# Patient Record
Sex: MALE | Race: WHITE | Employment: FULL TIME | URBAN - NONMETROPOLITAN AREA
[De-identification: names, ages, dates, MRNs, and addresses within clinical notes are randomized per-mention and may not be internally consistent; named-entity substitution may affect disease eponyms.]

---

## 2018-02-24 ENCOUNTER — HOSPITAL ENCOUNTER (EMERGENCY)
Dept: GENERAL RADIOLOGY | Age: 28
Discharge: HOME | End: 2018-02-24

## 2018-02-24 ENCOUNTER — HOSPITAL ENCOUNTER (EMERGENCY)
Age: 28
Discharge: HOME | End: 2018-02-24

## 2018-02-24 VITALS
HEART RATE: 69 BPM | BODY MASS INDEX: 37.35 KG/M2 | TEMPERATURE: 99.5 F | RESPIRATION RATE: 16 BRPM | HEIGHT: 74 IN | DIASTOLIC BLOOD PRESSURE: 82 MMHG | OXYGEN SATURATION: 98 % | SYSTOLIC BLOOD PRESSURE: 128 MMHG | WEIGHT: 291 LBS

## 2018-02-24 DIAGNOSIS — M54.32 SCIATICA OF LEFT SIDE: ICD-10-CM

## 2018-02-24 DIAGNOSIS — M62.830 LUMBAR PARASPINAL MUSCLE SPASM: Primary | ICD-10-CM

## 2018-02-24 PROCEDURE — 99214 OFFICE O/P EST MOD 30 MIN: CPT

## 2018-02-24 PROCEDURE — 99203 OFFICE O/P NEW LOW 30 MIN: CPT | Performed by: NURSE PRACTITIONER

## 2018-02-24 PROCEDURE — 96372 THER/PROPH/DIAG INJ SC/IM: CPT

## 2018-02-24 PROCEDURE — 6360000002 HC RX W HCPCS: Performed by: NURSE PRACTITIONER

## 2018-02-24 PROCEDURE — 72100 X-RAY EXAM L-S SPINE 2/3 VWS: CPT

## 2018-02-24 RX ORDER — METHYLPREDNISOLONE 4 MG/1
TABLET ORAL
Qty: 1 KIT | Refills: 0 | Status: SHIPPED | OUTPATIENT
Start: 2018-02-24 | End: 2018-03-02

## 2018-02-24 RX ORDER — METHYLPREDNISOLONE ACETATE 80 MG/ML
80 INJECTION, SUSPENSION INTRA-ARTICULAR; INTRALESIONAL; INTRAMUSCULAR; SOFT TISSUE ONCE
Status: COMPLETED | OUTPATIENT
Start: 2018-02-24 | End: 2018-02-24

## 2018-02-24 RX ORDER — CYCLOBENZAPRINE HCL 10 MG
10 TABLET ORAL 3 TIMES DAILY PRN
Qty: 30 TABLET | Refills: 0 | Status: SHIPPED | OUTPATIENT
Start: 2018-02-24 | End: 2018-03-06

## 2018-02-24 RX ORDER — KETOROLAC TROMETHAMINE 30 MG/ML
60 INJECTION, SOLUTION INTRAMUSCULAR; INTRAVENOUS ONCE
Status: COMPLETED | OUTPATIENT
Start: 2018-02-24 | End: 2018-02-24

## 2018-02-24 RX ORDER — IBUPROFEN 600 MG/1
600 TABLET ORAL EVERY 6 HOURS PRN
Qty: 60 TABLET | Refills: 0 | Status: SHIPPED | OUTPATIENT
Start: 2018-02-24 | End: 2020-09-02 | Stop reason: ALTCHOICE

## 2018-02-24 RX ADMIN — KETOROLAC TROMETHAMINE 60 MG: 30 INJECTION, SOLUTION INTRAMUSCULAR at 14:54

## 2018-02-24 RX ADMIN — METHYLPREDNISOLONE ACETATE 80 MG: 80 INJECTION, SUSPENSION INTRA-ARTICULAR; INTRALESIONAL; INTRAMUSCULAR; SOFT TISSUE at 14:52

## 2018-02-24 ASSESSMENT — ENCOUNTER SYMPTOMS
COUGH: 0
CHEST TIGHTNESS: 0
BACK PAIN: 1

## 2018-02-24 ASSESSMENT — PAIN SCALES - GENERAL
PAINLEVEL_OUTOF10: 3

## 2018-02-24 ASSESSMENT — PAIN DESCRIPTION - PAIN TYPE
TYPE: CHRONIC PAIN
TYPE: ACUTE PAIN

## 2018-02-24 ASSESSMENT — PAIN DESCRIPTION - LOCATION
LOCATION: HIP
LOCATION: HIP

## 2018-02-24 ASSESSMENT — PAIN DESCRIPTION - ORIENTATION
ORIENTATION: RIGHT;LEFT
ORIENTATION: LEFT

## 2018-02-24 ASSESSMENT — PAIN DESCRIPTION - FREQUENCY: FREQUENCY: CONTINUOUS

## 2018-02-24 ASSESSMENT — PAIN DESCRIPTION - DESCRIPTORS
DESCRIPTORS: ACHING
DESCRIPTORS: CONSTANT;ACHING

## 2019-05-06 ENCOUNTER — HOSPITAL ENCOUNTER (EMERGENCY)
Age: 29
Discharge: HOME | End: 2019-05-06

## 2019-05-06 VITALS
WEIGHT: 280 LBS | OXYGEN SATURATION: 98 % | SYSTOLIC BLOOD PRESSURE: 130 MMHG | HEIGHT: 74 IN | TEMPERATURE: 98.4 F | DIASTOLIC BLOOD PRESSURE: 82 MMHG | HEART RATE: 98 BPM | RESPIRATION RATE: 16 BRPM | BODY MASS INDEX: 35.94 KG/M2

## 2019-05-06 DIAGNOSIS — J01.90 ACUTE BACTERIAL SINUSITIS: Primary | ICD-10-CM

## 2019-05-06 DIAGNOSIS — B96.89 ACUTE BACTERIAL SINUSITIS: Primary | ICD-10-CM

## 2019-05-06 LAB
GROUP A STREP CULTURE, REFLEX: NEGATIVE
REFLEX THROAT C + S: NORMAL

## 2019-05-06 PROCEDURE — 99214 OFFICE O/P EST MOD 30 MIN: CPT

## 2019-05-06 PROCEDURE — 87880 STREP A ASSAY W/OPTIC: CPT

## 2019-05-06 PROCEDURE — 99213 OFFICE O/P EST LOW 20 MIN: CPT | Performed by: NURSE PRACTITIONER

## 2019-05-06 PROCEDURE — 87070 CULTURE OTHR SPECIMN AEROBIC: CPT

## 2019-05-06 RX ORDER — DOXYCYCLINE HYCLATE 100 MG
100 TABLET ORAL 2 TIMES DAILY
Qty: 14 TABLET | Refills: 0 | Status: SHIPPED | OUTPATIENT
Start: 2019-05-06 | End: 2019-05-13

## 2019-05-06 ASSESSMENT — ENCOUNTER SYMPTOMS
CHEST TIGHTNESS: 0
ABDOMINAL PAIN: 0
DIARRHEA: 0
SINUS PRESSURE: 1
RHINORRHEA: 0
SORE THROAT: 1
SHORTNESS OF BREATH: 0
COUGH: 1
VOMITING: 0
NAUSEA: 0

## 2019-05-06 ASSESSMENT — PAIN - FUNCTIONAL ASSESSMENT: PAIN_FUNCTIONAL_ASSESSMENT: ACTIVITIES ARE NOT PREVENTED

## 2019-05-06 ASSESSMENT — PAIN SCALES - GENERAL: PAINLEVEL_OUTOF10: 2

## 2019-05-06 ASSESSMENT — PAIN DESCRIPTION - PAIN TYPE: TYPE: ACUTE PAIN

## 2019-05-06 ASSESSMENT — PAIN DESCRIPTION - DESCRIPTORS: DESCRIPTORS: HEADACHE

## 2019-05-06 NOTE — ED TRIAGE NOTES
Patient ambulated to room with complaint of nasal congestion, scratchy throat and headache.  States symptoms started last Wednesday

## 2019-05-06 NOTE — ED PROVIDER NOTES
[address]       Chief Complaint   Patient presents with    Headache     sinus pressure    Nasal Congestion       Nurses Notes reviewed and I agree except as noted in the HPI. HISTORY OF PRESENT ILLNESS   Yang Glynn is a 33 y.o. male who presents for evaluation of sinus congestion/pressure since last Wednesday and has not taken any medications for his symptoms. He has has a sore throat and is concerned for strep because it hurts pretty bad. REVIEW OF SYSTEMS     Review of Systems   Constitutional: Negative for chills, fatigue and fever. HENT: Positive for congestion, postnasal drip, sinus pressure and sore throat. Negative for ear pain and rhinorrhea. Respiratory: Positive for cough. Negative for chest tightness and shortness of breath. Cardiovascular: Negative for chest pain. Gastrointestinal: Negative for abdominal pain, diarrhea, nausea and vomiting. Skin: Negative for rash. Allergic/Immunologic: Negative for environmental allergies and food allergies. Neurological: Negative for headaches. PAST MEDICAL HISTORY   History reviewed. No pertinent past medical history. SURGICAL HISTORY     Patient  has a past surgical history that includes Wrist surgery. CURRENT MEDICATIONS       Previous Medications    IBUPROFEN (ADVIL;MOTRIN) 600 MG TABLET    Take 1 tablet by mouth every 6 hours as needed for Pain    MENTHOL, TOPICAL ANALGESIC, (BIOFREEZE EX)    Apply topically       ALLERGIES     Patient is is allergic to pcn [penicillins]. FAMILY HISTORY     Patient'sfamily history includes Diabetes in his father. SOCIAL HISTORY     Patient  reports that he has been smoking cigarettes. He has a 2.50 pack-year smoking history. He has never used smokeless tobacco. He reports that he drinks about 2.4 oz of alcohol per week. He reports that he does not use drugs.     PHYSICAL EXAM     ED TRIAGE VITALS  BP: 130/82, Temp: 98.4 °F (36.9 °C), Pulse: 98, Resp: 16, SpO2: 98 %  Physical Exam   Constitutional: He is oriented to person, place, and time. He appears well-developed and well-nourished. He is cooperative. No distress. HENT:   Head: Normocephalic and atraumatic. Right Ear: Tympanic membrane, external ear and ear canal normal.   Left Ear: Tympanic membrane, external ear and ear canal normal.   Nose: Mucosal edema and rhinorrhea present. Right sinus exhibits no maxillary sinus tenderness and no frontal sinus tenderness. Left sinus exhibits no maxillary sinus tenderness and no frontal sinus tenderness. Mouth/Throat: Uvula is midline and mucous membranes are normal. Posterior oropharyngeal edema and posterior oropharyngeal erythema present. Eyes: Conjunctivae are normal.   Neck: Normal range of motion. Cardiovascular: Normal rate and normal heart sounds. Pulmonary/Chest: Effort normal and breath sounds normal.   Lymphadenopathy:     He has no cervical adenopathy. Neurological: He is alert and oriented to person, place, and time. Skin: Skin is warm and dry. No rash (on exposed surfaces) noted. Psychiatric: He has a normal mood and affect. His speech is normal.   Nursing note and vitals reviewed. DIAGNOSTIC RESULTS   Labs:   Results for orders placed or performed during the hospital encounter of 05/06/19   Strep A culture, throat   Result Value Ref Range    REFLEX THROAT C + S INDICATED    STREP A ANTIGEN   Result Value Ref Range    GROUP A STREP CULTURE, REFLEX NEGATIVE        IMAGING:  No orders to display     URGENT CARE COURSE:     Vitals:    05/06/19 1253   BP: 130/82   Pulse: 98   Resp: 16   Temp: 98.4 °F (36.9 °C)   TempSrc: Temporal   SpO2: 98%   Weight: 280 lb (127 kg)   Height: 6' 2\" (1.88 m)       Medications - No data to display  PROCEDURES:  None  FINALIMPRESSION      1.  Acute bacterial sinusitis        DISPOSITION/PLAN   DISPOSITION    Discharge   Strep negative culture pending  Physical assessment findings, diagnostic testing(s) if applicable, and vital signs reviewed with patient/patient representative. Questions answered. Medications as directed, including OTC medications for supportive care. Education provided on medications. Differential diagnosis(s) discussed with patient/patient representative. Home care/self care instructions reviewed with patient/patient representative. Patient is to follow-up with family care provider in 2-3 days if no improvement. Patient is to go to the emergency department if symptoms worsen. Patient/patient representative is aware of care plan, questions answered, verbalizes understanding and is in agreement. Teach back method used for patient/patient representative teaching(s) and printed instructions attached to after visit summary.     PATIENT REFERRED TO:  [facility]  [address] [facility]  [phone]    As needed, If symptoms worsen, return for further evaluation    DISCHARGE MEDICATIONS:  New Prescriptions    DOXYCYCLINE HYCLATE (VIBRA-TABS) 100 MG TABLET    Take 1 tablet by mouth 2 times daily for 7 days     Current Discharge Medication List          Nani Stevens, [address], APRN - CNP  05/06/19 0496

## 2019-05-08 LAB — THROAT/NOSE CULTURE: NORMAL

## 2019-05-20 ENCOUNTER — HOSPITAL ENCOUNTER (EMERGENCY)
Age: 29
Discharge: HOME | End: 2019-05-20

## 2019-05-20 VITALS
BODY MASS INDEX: 35.94 KG/M2 | RESPIRATION RATE: 16 BRPM | WEIGHT: 280 LBS | SYSTOLIC BLOOD PRESSURE: 134 MMHG | DIASTOLIC BLOOD PRESSURE: 84 MMHG | HEART RATE: 86 BPM | OXYGEN SATURATION: 99 % | HEIGHT: 74 IN | TEMPERATURE: 97.6 F

## 2019-05-20 DIAGNOSIS — S50.312A ABRASION OF LEFT ELBOW, INITIAL ENCOUNTER: ICD-10-CM

## 2019-05-20 DIAGNOSIS — S60.512A ABRASION, HAND, LEFT, INITIAL ENCOUNTER: Primary | ICD-10-CM

## 2019-05-20 PROCEDURE — 99214 OFFICE O/P EST MOD 30 MIN: CPT | Performed by: NURSE PRACTITIONER

## 2019-05-20 PROCEDURE — 2709999900 HC NON-CHARGEABLE SUPPLY

## 2019-05-20 PROCEDURE — 6370000000 HC RX 637 (ALT 250 FOR IP): Performed by: NURSE PRACTITIONER

## 2019-05-20 PROCEDURE — 99212 OFFICE O/P EST SF 10 MIN: CPT

## 2019-05-20 RX ORDER — DIAPER,BRIEF,INFANT-TODD,DISP
EACH MISCELLANEOUS
Qty: 30 G | Refills: 1 | Status: SHIPPED | OUTPATIENT
Start: 2019-05-20 | End: 2019-05-30

## 2019-05-20 RX ORDER — DIAPER,BRIEF,INFANT-TODD,DISP
EACH MISCELLANEOUS 2 TIMES DAILY
Status: DISCONTINUED | OUTPATIENT
Start: 2019-05-20 | End: 2019-05-20 | Stop reason: HOSPADM

## 2019-05-20 RX ADMIN — BACITRACIN ZINC 1 G: 500 OINTMENT TOPICAL at 08:38

## 2019-05-20 ASSESSMENT — ENCOUNTER SYMPTOMS
EYE PAIN: 0
EYE DISCHARGE: 0
COLOR CHANGE: 0
EYE REDNESS: 0
SORE THROAT: 0
DIARRHEA: 0
SHORTNESS OF BREATH: 0
NAUSEA: 0
VOMITING: 0
EYE ITCHING: 0

## 2019-05-20 ASSESSMENT — PAIN DESCRIPTION - DESCRIPTORS: DESCRIPTORS: CONSTANT

## 2019-05-20 ASSESSMENT — PAIN DESCRIPTION - PROGRESSION: CLINICAL_PROGRESSION: NOT CHANGED

## 2019-05-20 ASSESSMENT — PAIN DESCRIPTION - FREQUENCY: FREQUENCY: CONTINUOUS

## 2019-05-20 ASSESSMENT — PAIN DESCRIPTION - PAIN TYPE: TYPE: ACUTE PAIN

## 2019-05-20 ASSESSMENT — PAIN DESCRIPTION - LOCATION: LOCATION: ARM

## 2019-05-20 ASSESSMENT — PAIN DESCRIPTION - ONSET: ONSET: SUDDEN

## 2019-05-20 ASSESSMENT — PAIN - FUNCTIONAL ASSESSMENT: PAIN_FUNCTIONAL_ASSESSMENT: PREVENTS OR INTERFERES SOME ACTIVE ACTIVITIES AND ADLS

## 2019-05-20 ASSESSMENT — PAIN SCALES - GENERAL: PAINLEVEL_OUTOF10: 2

## 2019-05-20 ASSESSMENT — PAIN DESCRIPTION - ORIENTATION: ORIENTATION: LEFT
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
## 2019-05-20 NOTE — PROGRESS NOTES
Wounds cleaned with wound wash and dried thoroughly. Bacitracin ointment applied and covered with 2 band aids.

## 2019-06-25 ENCOUNTER — HOSPITAL ENCOUNTER (EMERGENCY)
Age: 29
Discharge: HOME | End: 2019-06-25

## 2019-06-25 ENCOUNTER — HOSPITAL ENCOUNTER (EMERGENCY)
Dept: GENERAL RADIOLOGY | Age: 29
Discharge: HOME | End: 2019-06-25

## 2019-06-25 VITALS
BODY MASS INDEX: 35.94 KG/M2 | HEART RATE: 112 BPM | OXYGEN SATURATION: 97 % | WEIGHT: 280 LBS | SYSTOLIC BLOOD PRESSURE: 143 MMHG | HEIGHT: 74 IN | RESPIRATION RATE: 16 BRPM | TEMPERATURE: 97.4 F | DIASTOLIC BLOOD PRESSURE: 79 MMHG

## 2019-06-25 DIAGNOSIS — M25.562 ACUTE PAIN OF LEFT KNEE: Primary | ICD-10-CM

## 2019-06-25 PROCEDURE — 99213 OFFICE O/P EST LOW 20 MIN: CPT

## 2019-06-25 PROCEDURE — 2709999900 HC NON-CHARGEABLE SUPPLY

## 2019-06-25 PROCEDURE — 99214 OFFICE O/P EST MOD 30 MIN: CPT | Performed by: NURSE PRACTITIONER

## 2019-06-25 PROCEDURE — 73564 X-RAY EXAM KNEE 4 OR MORE: CPT

## 2019-06-25 ASSESSMENT — ENCOUNTER SYMPTOMS: COUGH: 0

## 2019-06-25 ASSESSMENT — PAIN DESCRIPTION - ORIENTATION: ORIENTATION: LEFT

## 2019-06-25 ASSESSMENT — PAIN SCALES - GENERAL: PAINLEVEL_OUTOF10: 2

## 2019-06-25 ASSESSMENT — PAIN DESCRIPTION - LOCATION: LOCATION: KNEE

## 2019-06-25 NOTE — ED TRIAGE NOTES
AMbulatory to room 3 with limp c/o left knee pain. Refused wheelchair. Pt c/o left knee pain \" 2 weeks ago I reach to back car seat to get something and felt a pop left knee. Had pain on and off. Then today while at work jazz and felt another pop\" pt skin red- sunburn and pt diaphoretic.  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

## 2019-06-25 NOTE — ED NOTES
Pt verbalized discharge instructions. Pt informed to go to ER if develop chest pain, shortness of breath or abdominal pain. Pt ambulatory out in stable condition. Assessment unchanged.        Salvatore Cruz RN  06/25/19 8619

## 2019-10-16 ENCOUNTER — HOSPITAL ENCOUNTER (EMERGENCY)
Age: 29
Discharge: HOME | End: 2019-10-16

## 2019-10-16 VITALS
TEMPERATURE: 98.1 F | HEIGHT: 74 IN | HEART RATE: 88 BPM | WEIGHT: 272 LBS | SYSTOLIC BLOOD PRESSURE: 131 MMHG | RESPIRATION RATE: 20 BRPM | BODY MASS INDEX: 34.91 KG/M2 | OXYGEN SATURATION: 98 % | DIASTOLIC BLOOD PRESSURE: 60 MMHG

## 2019-10-16 DIAGNOSIS — J06.9 VIRAL URI WITH COUGH: Primary | ICD-10-CM

## 2019-10-16 DIAGNOSIS — Z72.0 TOBACCO USE: ICD-10-CM

## 2019-10-16 PROCEDURE — 99213 OFFICE O/P EST LOW 20 MIN: CPT | Performed by: NURSE PRACTITIONER

## 2019-10-16 PROCEDURE — 99213 OFFICE O/P EST LOW 20 MIN: CPT

## 2019-10-16 RX ORDER — ALBUTEROL SULFATE 90 UG/1
2 AEROSOL, METERED RESPIRATORY (INHALATION) EVERY 4 HOURS PRN
Qty: 1 INHALER | Refills: 0 | Status: ON HOLD | OUTPATIENT
Start: 2019-10-16 | End: 2019-11-10 | Stop reason: ALTCHOICE

## 2019-10-16 RX ORDER — AZITHROMYCIN 250 MG/1
TABLET, FILM COATED ORAL
Qty: 1 PACKET | Refills: 0 | Status: SHIPPED | OUTPATIENT
Start: 2019-10-16 | End: 2019-10-20

## 2019-10-16 RX ORDER — PREDNISONE 20 MG/1
40 TABLET ORAL DAILY
Qty: 10 TABLET | Refills: 0 | Status: SHIPPED | OUTPATIENT
Start: 2019-10-16 | End: 2019-10-21

## 2019-10-16 ASSESSMENT — ENCOUNTER SYMPTOMS
COUGH: 1
WHEEZING: 1
NAUSEA: 0
ABDOMINAL PAIN: 0
VOMITING: 0
SORE THROAT: 0
SHORTNESS OF BREATH: 1
DIARRHEA: 0

## 2019-11-10 ENCOUNTER — HOSPITAL ENCOUNTER (INPATIENT)
Age: 29
LOS: 3 days | Discharge: HOME | DRG: 882 | End: 2019-11-13
Attending: FAMILY MEDICINE | Admitting: PSYCHIATRY & NEUROLOGY

## 2019-11-10 DIAGNOSIS — R45.851 SUICIDAL IDEATION: Primary | ICD-10-CM

## 2019-11-10 PROBLEM — F32.9 MAJOR DEPRESSIVE DISORDER, SINGLE EPISODE: Status: ACTIVE | Noted: 2019-11-10

## 2019-11-10 PROBLEM — F33.9 MAJOR DEPRESSIVE DISORDER, RECURRENT (HCC): Status: ACTIVE | Noted: 2019-11-10

## 2019-11-10 LAB
ACETAMINOPHEN LEVEL: < 5 UG/ML (ref 0–20)
ALBUMIN SERPL-MCNC: 4.7 G/DL (ref 3.5–5.1)
ALP BLD-CCNC: 77 U/L (ref 38–126)
ALT SERPL-CCNC: 31 U/L (ref 11–66)
AMPHETAMINE+METHAMPHETAMINE URINE SCREEN: NEGATIVE
ANION GAP SERPL CALCULATED.3IONS-SCNC: 16 MEQ/L (ref 8–16)
AST SERPL-CCNC: 20 U/L (ref 5–40)
BARBITURATE QUANTITATIVE URINE: NEGATIVE
BASOPHILS # BLD: 0.7 %
BASOPHILS ABSOLUTE: 0.1 THOU/MM3 (ref 0–0.1)
BENZODIAZEPINE QUANTITATIVE URINE: NEGATIVE
BILIRUB SERPL-MCNC: 0.3 MG/DL (ref 0.3–1.2)
BILIRUBIN DIRECT: < 0.2 MG/DL (ref 0–0.3)
BUN BLDV-MCNC: 9 MG/DL (ref 7–22)
CALCIUM SERPL-MCNC: 9.5 MG/DL (ref 8.5–10.5)
CANNABINOID QUANTITATIVE URINE: NEGATIVE
CHLORIDE BLD-SCNC: 104 MEQ/L (ref 98–111)
CO2: 21 MEQ/L (ref 23–33)
COCAINE METABOLITE QUANTITATIVE URINE: NEGATIVE
CREAT SERPL-MCNC: 0.7 MG/DL (ref 0.4–1.2)
EOSINOPHIL # BLD: 1.6 %
EOSINOPHILS ABSOLUTE: 0.2 THOU/MM3 (ref 0–0.4)
ERYTHROCYTE [DISTWIDTH] IN BLOOD BY AUTOMATED COUNT: 12.9 % (ref 11.5–14.5)
ERYTHROCYTE [DISTWIDTH] IN BLOOD BY AUTOMATED COUNT: 42.8 FL (ref 35–45)
ETHYL ALCOHOL, SERUM: 0.07 %
GFR SERPL CREATININE-BSD FRML MDRD: > 90 ML/MIN/1.73M2
GLUCOSE BLD-MCNC: 145 MG/DL (ref 70–108)
HCT VFR BLD CALC: 46.7 % (ref 42–52)
HEMOGLOBIN: 16.1 GM/DL (ref 14–18)
IMMATURE GRANS (ABS): 0.05 THOU/MM3 (ref 0–0.07)
IMMATURE GRANULOCYTES: 0.4 %
LYMPHOCYTES # BLD: 18.5 %
LYMPHOCYTES ABSOLUTE: 2.3 THOU/MM3 (ref 1–4.8)
MCH RBC QN AUTO: 31.4 PG (ref 26–33)
MCHC RBC AUTO-ENTMCNC: 34.5 GM/DL (ref 32.2–35.5)
MCV RBC AUTO: 91 FL (ref 80–94)
MONOCYTES # BLD: 4.6 %
MONOCYTES ABSOLUTE: 0.6 THOU/MM3 (ref 0.4–1.3)
NUCLEATED RED BLOOD CELLS: 0 /100 WBC
OPIATES, URINE: NEGATIVE
OSMOLALITY CALCULATION: 282.5 MOSMOL/KG (ref 275–300)
OXYCODONE: NEGATIVE
PHENCYCLIDINE QUANTITATIVE URINE: NEGATIVE
PLATELET # BLD: 281 THOU/MM3 (ref 130–400)
PMV BLD AUTO: 10.9 FL (ref 9.4–12.4)
POTASSIUM SERPL-SCNC: 3.1 MEQ/L (ref 3.5–5.2)
RBC # BLD: 5.13 MILL/MM3 (ref 4.7–6.1)
SALICYLATE, SERUM: < 0.3 MG/DL (ref 2–10)
SEG NEUTROPHILS: 74.2 %
SEGMENTED NEUTROPHILS ABSOLUTE COUNT: 9.1 THOU/MM3 (ref 1.8–7.7)
SODIUM BLD-SCNC: 141 MEQ/L (ref 135–145)
TOTAL PROTEIN: 7.6 G/DL (ref 6.1–8)
TSH SERPL DL<=0.05 MIU/L-ACNC: 2.1 UIU/ML (ref 0.4–4.2)
WBC # BLD: 12.2 THOU/MM3 (ref 4.8–10.8)

## 2019-11-10 PROCEDURE — 1240000000 HC EMOTIONAL WELLNESS R&B

## 2019-11-10 PROCEDURE — 80307 DRUG TEST PRSMV CHEM ANLYZR: CPT

## 2019-11-10 PROCEDURE — 80053 COMPREHEN METABOLIC PANEL: CPT

## 2019-11-10 PROCEDURE — 82248 BILIRUBIN DIRECT: CPT

## 2019-11-10 PROCEDURE — 85025 COMPLETE CBC W/AUTO DIFF WBC: CPT

## 2019-11-10 PROCEDURE — G0480 DRUG TEST DEF 1-7 CLASSES: HCPCS

## 2019-11-10 PROCEDURE — 36415 COLL VENOUS BLD VENIPUNCTURE: CPT

## 2019-11-10 PROCEDURE — 6370000000 HC RX 637 (ALT 250 FOR IP): Performed by: PSYCHIATRY & NEUROLOGY

## 2019-11-10 PROCEDURE — 84443 ASSAY THYROID STIM HORMONE: CPT

## 2019-11-10 PROCEDURE — 99285 EMERGENCY DEPT VISIT HI MDM: CPT

## 2019-11-10 RX ORDER — MAGNESIUM HYDROXIDE/ALUMINUM HYDROXICE/SIMETHICONE 120; 1200; 1200 MG/30ML; MG/30ML; MG/30ML
30 SUSPENSION ORAL EVERY 6 HOURS PRN
Status: DISCONTINUED | OUTPATIENT
Start: 2019-11-10 | End: 2019-11-13 | Stop reason: HOSPADM

## 2019-11-10 RX ORDER — TRAZODONE HYDROCHLORIDE 50 MG/1
50 TABLET ORAL NIGHTLY PRN
Status: DISCONTINUED | OUTPATIENT
Start: 2019-11-10 | End: 2019-11-13 | Stop reason: HOSPADM

## 2019-11-10 RX ORDER — ACETAMINOPHEN 325 MG/1
650 TABLET ORAL EVERY 4 HOURS PRN
Status: DISCONTINUED | OUTPATIENT
Start: 2019-11-10 | End: 2019-11-13 | Stop reason: HOSPADM

## 2019-11-10 RX ORDER — IBUPROFEN 400 MG/1
400 TABLET ORAL EVERY 6 HOURS PRN
Status: DISCONTINUED | OUTPATIENT
Start: 2019-11-10 | End: 2019-11-13 | Stop reason: HOSPADM

## 2019-11-10 RX ORDER — NICOTINE 21 MG/24HR
1 PATCH, TRANSDERMAL 24 HOURS TRANSDERMAL DAILY
Status: DISCONTINUED | OUTPATIENT
Start: 2019-11-10 | End: 2019-11-13 | Stop reason: HOSPADM

## 2019-11-10 RX ORDER — HYDROXYZINE HYDROCHLORIDE 25 MG/1
50 TABLET, FILM COATED ORAL 3 TIMES DAILY PRN
Status: DISCONTINUED | OUTPATIENT
Start: 2019-11-10 | End: 2019-11-13 | Stop reason: HOSPADM

## 2019-11-10 ASSESSMENT — SLEEP AND FATIGUE QUESTIONNAIRES
AVERAGE NUMBER OF SLEEP HOURS: 6
RESTFUL SLEEP: NO
DO YOU HAVE DIFFICULTY SLEEPING: YES
RESTFUL SLEEP: NO
DIFFICULTY FALLING ASLEEP: YES
AVERAGE NUMBER OF SLEEP HOURS: 6
SLEEP PATTERN: DIFFICULTY FALLING ASLEEP
DIFFICULTY STAYING ASLEEP: NO
DO YOU HAVE DIFFICULTY SLEEPING: YES
DO YOU USE A SLEEP AID: NO
DIFFICULTY ARISING: NO
DIFFICULTY STAYING ASLEEP: NO
DIFFICULTY ARISING: NO
SLEEP PATTERN: DIFFICULTY FALLING ASLEEP
DIFFICULTY FALLING ASLEEP: YES
DO YOU USE A SLEEP AID: NO

## 2019-11-10 ASSESSMENT — LIFESTYLE VARIABLES
HISTORY_ALCOHOL_USE: NO
HISTORY_ALCOHOL_USE: NO

## 2019-11-10 ASSESSMENT — PATIENT HEALTH QUESTIONNAIRE - PHQ9
SUM OF ALL RESPONSES TO PHQ QUESTIONS 1-9: 2
SUM OF ALL RESPONSES TO PHQ QUESTIONS 1-9: 12

## 2019-11-10 ASSESSMENT — PAIN SCALES - GENERAL: PAINLEVEL_OUTOF10: 0

## 2019-11-10 ASSESSMENT — ENCOUNTER SYMPTOMS
FACIAL SWELLING: 0
SHORTNESS OF BREATH: 0

## 2019-11-11 PROBLEM — F43.25 ADJUSTMENT DISORDER WITH MIXED DISTURBANCE OF EMOTIONS AND CONDUCT: Status: ACTIVE | Noted: 2019-11-11

## 2019-11-11 PROCEDURE — 1240000000 HC EMOTIONAL WELLNESS R&B

## 2019-11-11 PROCEDURE — 6370000000 HC RX 637 (ALT 250 FOR IP): Performed by: PSYCHIATRY & NEUROLOGY

## 2019-11-11 RX ORDER — CITALOPRAM 20 MG/1
20 TABLET ORAL DAILY
Status: DISCONTINUED | OUTPATIENT
Start: 2019-11-11 | End: 2019-11-13 | Stop reason: HOSPADM

## 2019-11-11 ASSESSMENT — SLEEP AND FATIGUE QUESTIONNAIRES
DO YOU HAVE DIFFICULTY SLEEPING: YES
RESTFUL SLEEP: NO
SLEEP PATTERN: DIFFICULTY FALLING ASLEEP
DIFFICULTY ARISING: NO
DIFFICULTY STAYING ASLEEP: NO
DO YOU USE A SLEEP AID: NO
DIFFICULTY FALLING ASLEEP: YES
AVERAGE NUMBER OF SLEEP HOURS: 6

## 2019-11-11 ASSESSMENT — PAIN SCALES - GENERAL: PAINLEVEL_OUTOF10: 0

## 2019-11-12 PROCEDURE — 6370000000 HC RX 637 (ALT 250 FOR IP): Performed by: PSYCHIATRY & NEUROLOGY

## 2019-11-12 PROCEDURE — 1240000000 HC EMOTIONAL WELLNESS R&B

## 2019-11-12 ASSESSMENT — PAIN SCALES - GENERAL: PAINLEVEL_OUTOF10: 0

## 2019-11-13 VITALS
TEMPERATURE: 96.2 F | WEIGHT: 276 LBS | BODY MASS INDEX: 35.42 KG/M2 | RESPIRATION RATE: 18 BRPM | HEART RATE: 76 BPM | HEIGHT: 74 IN | OXYGEN SATURATION: 98 % | SYSTOLIC BLOOD PRESSURE: 135 MMHG | DIASTOLIC BLOOD PRESSURE: 75 MMHG

## 2019-11-13 PROCEDURE — 5130000000 HC BRIDGE APPOINTMENT

## 2019-11-13 PROCEDURE — 6370000000 HC RX 637 (ALT 250 FOR IP): Performed by: PSYCHIATRY & NEUROLOGY

## 2019-11-13 ASSESSMENT — PAIN SCALES - GENERAL: PAINLEVEL_OUTOF10: 0

## 2019-11-14 ENCOUNTER — TELEPHONE (OUTPATIENT)
Dept: PSYCHIATRY | Age: 29
End: 2019-11-14

## 2020-03-03 ENCOUNTER — HOSPITAL ENCOUNTER (EMERGENCY)
Age: 30
Discharge: HOME | End: 2020-03-03

## 2020-03-03 VITALS
OXYGEN SATURATION: 97 % | HEIGHT: 74 IN | WEIGHT: 260 LBS | SYSTOLIC BLOOD PRESSURE: 127 MMHG | BODY MASS INDEX: 33.37 KG/M2 | DIASTOLIC BLOOD PRESSURE: 79 MMHG | RESPIRATION RATE: 18 BRPM | TEMPERATURE: 98.9 F | HEART RATE: 99 BPM

## 2020-03-03 PROCEDURE — 99212 OFFICE O/P EST SF 10 MIN: CPT

## 2020-03-03 PROCEDURE — 99213 OFFICE O/P EST LOW 20 MIN: CPT | Performed by: NURSE PRACTITIONER

## 2020-03-03 RX ORDER — SULFAMETHOXAZOLE AND TRIMETHOPRIM 800; 160 MG/1; MG/1
1 TABLET ORAL 2 TIMES DAILY
Qty: 20 TABLET | Refills: 0 | Status: SHIPPED | OUTPATIENT
Start: 2020-03-03 | End: 2020-03-13

## 2020-03-03 ASSESSMENT — ENCOUNTER SYMPTOMS
NAUSEA: 0
COUGH: 0
COLOR CHANGE: 1
SHORTNESS OF BREATH: 0
VOMITING: 0
SORE THROAT: 0

## 2020-03-03 NOTE — ED PROVIDER NOTES
[facility]  Urgent Care Encounter       CHIEF COMPLAINT       Chief Complaint   Patient presents with    Wound Check       Nurses Notes reviewed and I agree except as noted in the HPI. HISTORY OF PRESENT ILLNESS   Max Bradford is a 30 y.o. male who presents for evaluation of redness and swelling to the right upper arm is been ongoing for the past 24 hours. Patient states that he had a tattoo touched up 2 days ago and noticed the redness not long after. He denies any significant fever, chills, nausea, or vomiting but states that the redness does seem to be spreading. The history is provided by the patient. REVIEW OF SYSTEMS     Review of Systems   Constitutional: Negative for chills and fever. HENT: Negative for congestion and sore throat. Respiratory: Negative for cough and shortness of breath. Cardiovascular: Negative for chest pain. Gastrointestinal: Negative for nausea and vomiting. Musculoskeletal: Negative for arthralgias and myalgias. Skin: Positive for color change and wound. Negative for rash. Allergic/Immunologic: Negative for environmental allergies. Neurological: Negative for headaches. PAST MEDICAL HISTORY         Diagnosis Date    Psychiatric problem        SURGICALHISTORY     Patient  has a past surgical history that includes Wrist surgery. CURRENT MEDICATIONS       Previous Medications    IBUPROFEN (ADVIL;MOTRIN) 600 MG TABLET    Take 1 tablet by mouth every 6 hours as needed for Pain       ALLERGIES     Patient is is allergic to pcn [penicillins]. Patients   There is no immunization history on file for this patient. FAMILY HISTORY     Patient's family history includes Cancer in his paternal grandfather; Diabetes in his father and paternal grandfather; Heart Disease in his mother. SOCIAL HISTORY     Patient  reports that he has been smoking cigarettes. He has a 2.50 pack-year smoking history.  He has never used smokeless tobacco. He reports current alcohol use of about 4.0 standard drinks of alcohol per week. He reports previous drug use. PHYSICAL EXAM     ED TRIAGE VITALS  BP: 127/79, Temp: 98.9 °F (37.2 °C), Pulse: 99, Resp: 18, SpO2: 97 %,Estimated body mass index is 33.38 kg/m² as calculated from the following:    Height as of this encounter: 6' 2\" (1.88 m). Weight as of this encounter: 260 lb (117.9 kg). ,No LMP for male patient. Physical Exam  Vitals signs and nursing note reviewed. Constitutional:       General: He is not in acute distress. Appearance: He is well-developed. He is not diaphoretic. Eyes:      Conjunctiva/sclera:      Right eye: Right conjunctiva is not injected. Left eye: Left conjunctiva is not injected. Pupils: Pupils are equal.   Neck:      Musculoskeletal: Normal range of motion. Cardiovascular:      Rate and Rhythm: Normal rate and regular rhythm. Heart sounds: No murmur. Pulmonary:      Effort: Pulmonary effort is normal. No respiratory distress. Breath sounds: Normal breath sounds. Musculoskeletal:      Right knee: He exhibits normal range of motion. Left knee: He exhibits normal range of motion. Skin:     General: Skin is warm. Findings: Erythema present. No rash. Comments: 13 cm x 14 cm area of erythema that is warm to the touch is noted to the right upper arm/bicep consistent with cellulitis. Neurological:      Mental Status: He is alert and oriented to person, place, and time. Psychiatric:         Behavior: Behavior normal.         DIAGNOSTIC RESULTS     Labs:No results found for this visit on 03/03/20.     IMAGING:    No orders to display         EKG:      URGENT CARE COURSE:     Vitals:    03/03/20 1246   BP: 127/79   Pulse: 99   Resp: 18   Temp: 98.9 °F (37.2 °C)   TempSrc: Oral   SpO2: 97%   Weight: 260 lb (117.9 kg)   Height: 6' 2\" (1.88 m)       Medications - No data to display         PROCEDURES:  None    FINAL IMPRESSION      1.

## 2020-08-12 ENCOUNTER — HOSPITAL ENCOUNTER (EMERGENCY)
Age: 30
Discharge: HOME | End: 2020-08-12

## 2020-08-12 VITALS
WEIGHT: 262.6 LBS | DIASTOLIC BLOOD PRESSURE: 79 MMHG | OXYGEN SATURATION: 96 % | HEART RATE: 87 BPM | HEIGHT: 74 IN | BODY MASS INDEX: 33.7 KG/M2 | SYSTOLIC BLOOD PRESSURE: 127 MMHG | TEMPERATURE: 98 F | RESPIRATION RATE: 20 BRPM

## 2020-08-12 PROCEDURE — 99212 OFFICE O/P EST SF 10 MIN: CPT

## 2020-08-12 PROCEDURE — 99213 OFFICE O/P EST LOW 20 MIN: CPT | Performed by: NURSE PRACTITIONER

## 2020-08-12 PROCEDURE — 6360000002 HC RX W HCPCS: Performed by: NURSE PRACTITIONER

## 2020-08-12 PROCEDURE — 96372 THER/PROPH/DIAG INJ SC/IM: CPT

## 2020-08-12 RX ORDER — LIDOCAINE 4 G/G
1 PATCH TOPICAL DAILY
Qty: 30 PATCH | Refills: 0 | Status: SHIPPED | OUTPATIENT
Start: 2020-08-12 | End: 2020-09-02 | Stop reason: ALTCHOICE

## 2020-08-12 RX ORDER — METHYLPREDNISOLONE ACETATE 80 MG/ML
80 INJECTION, SUSPENSION INTRA-ARTICULAR; INTRALESIONAL; INTRAMUSCULAR; SOFT TISSUE ONCE
Status: COMPLETED | OUTPATIENT
Start: 2020-08-12 | End: 2020-08-12

## 2020-08-12 RX ORDER — METHYLPREDNISOLONE ACETATE 40 MG/ML
40 INJECTION, SUSPENSION INTRA-ARTICULAR; INTRALESIONAL; INTRAMUSCULAR; SOFT TISSUE ONCE
Status: COMPLETED | OUTPATIENT
Start: 2020-08-12 | End: 2020-08-12

## 2020-08-12 RX ORDER — CYCLOBENZAPRINE HCL 10 MG
10 TABLET ORAL 3 TIMES DAILY PRN
Qty: 30 TABLET | Refills: 0 | Status: SHIPPED | OUTPATIENT
Start: 2020-08-12 | End: 2020-08-22

## 2020-08-12 RX ADMIN — METHYLPREDNISOLONE ACETATE 80 MG: 80 INJECTION, SUSPENSION INTRA-ARTICULAR; INTRALESIONAL; INTRAMUSCULAR; SOFT TISSUE at 15:15

## 2020-08-12 RX ADMIN — METHYLPREDNISOLONE ACETATE 40 MG: 40 INJECTION, SUSPENSION INTRA-ARTICULAR; INTRALESIONAL; INTRAMUSCULAR; SOFT TISSUE at 15:14

## 2020-08-12 ASSESSMENT — ENCOUNTER SYMPTOMS
ABDOMINAL SWELLING: 0
RECTAL PAIN: 0
VOMITING: 0
NAUSEA: 0
DIARRHEA: 0
CONSTIPATION: 0
COLOR CHANGE: 0
COUGH: 0
SHORTNESS OF BREATH: 0
ABDOMINAL PAIN: 0
WHEEZING: 0
BOWEL INCONTINENCE: 0
BACK PAIN: 1

## 2020-08-12 ASSESSMENT — PAIN DESCRIPTION - LOCATION: LOCATION: BACK

## 2020-08-12 ASSESSMENT — PAIN DESCRIPTION - ORIENTATION: ORIENTATION: LOWER

## 2020-08-12 ASSESSMENT — PAIN SCALES - GENERAL: PAINLEVEL_OUTOF10: 6

## 2020-08-12 NOTE — ED TRIAGE NOTES
To room 1 c/o left lower back pain raditaes down left leg. \" flared up again couple months ago wasn't a problem because I wasn't doing much because I was laid off.   Now were back and its been a pain\"

## 2020-08-12 NOTE — ED PROVIDER NOTES
[facility]  Urgent Care Encounter      CHIEF COMPLAINT       Chief Complaint   Patient presents with    Back Pain     left \" reflared up couple months ago\"       Nurses Notes reviewed and I agree except as noted in the HPI. HISTORY OFPRESENT ILLNESS   Mima Sepulveda is a 30 y.o. The history is provided by the patient. No  was used.      Back Pain   Location:  Lumbar spine (left worse than right)  Quality:  Stiffness  Stiffness is present:  Unable to specify  Radiates to: left leg. Pain severity:  Moderate  Pain is:  Same all the time  Onset quality:  Unable to specify  Duration:  2 months (\"on and off for a couple of months\")  Timing:  Intermittent  Progression:  Unchanged  Chronicity:  New  Context: physical stress and twisting    Context: not emotional stress, not falling, not jumping from heights, not lifting heavy objects, not MCA, not MVA, not occupational injury, not pedestrian accident, not recent illness and not recent injury    Relieved by:  Lying down and muscle relaxants  Worsened by:  Bending, lying down, twisting, sitting, standing and movement  Ineffective treatments:  NSAIDs, OTC medications, lying down and ibuprofen  Associated symptoms: leg pain (left pain) and numbness (positional)    Associated symptoms: no abdominal pain, no abdominal swelling, no bladder incontinence, no bowel incontinence, no chest pain, no dysuria, no fever, no headaches, no paresthesias, no pelvic pain, no perianal numbness, no tingling, no weakness and no weight loss    Risk factors: lack of exercise    Risk factors: no hx of cancer, no hx of osteoporosis, no menopause, not obese, not pregnant, no recent surgery, no steroid use and no vascular disease         REVIEW OF SYSTEMS     Review of Systems  Constitutional: Negative for appetite change, chills, diaphoresis, fatigue, fever and weight loss. Respiratory: Negative for cough, shortness of breath and wheezing. Cardiovascular: Negative for chest pain. Gastrointestinal: Negative for abdominal pain, bowel incontinence, constipation, diarrhea, nausea, rectal pain and vomiting. Genitourinary: Negative for bladder incontinence, dysuria and pelvic pain. Musculoskeletal: Positive for back pain, gait problem and myalgias. Negative for neck pain and neck stiffness. Skin: Negative for color change, pallor, rash and wound. Neurological: Positive for numbness (positional). Negative for dizziness, tingling, weakness, light-headedness, headaches and paresthesias. Psychiatric/Behavioral: Positive for sleep disturbance. Negative for agitation. The patient is not nervous/anxious. PAST MEDICAL HISTORY         Diagnosis Date    Psychiatric problem        SURGICAL HISTORY     Patient  has a past surgical history that includes Wrist surgery. CURRENT MEDICATIONS       Previous Medications    IBUPROFEN (ADVIL;MOTRIN) 600 MG TABLET    Take 1 tablet by mouth every 6 hours as needed for Pain    RANITIDINE HCL (ZANTAC PO)    Take by mouth       ALLERGIES     Patient is is allergic to pcn [penicillins]. FAMILY HISTORY     Patient's family history includes Cancer in his paternal grandfather; Diabetes in his father and paternal grandfather; Heart Disease in his mother. SOCIAL HISTORY     Patient  reports that he has been smoking cigarettes. He has a 5.00 pack-year smoking history. He has never used smokeless tobacco. He reports current alcohol use of about 4.0 standard drinks of alcohol per week. He reports previous drug use. PHYSICAL EXAM     ED TRIAGE VITALS  BP: 137/76, Temp: 98 °F (36.7 °C), Pulse: 90, Resp: 18, SpO2: 96 %  Physical Exam  Physical Exam  Vitals signs and nursing note reviewed. Constitutional:       General: He is not in acute distress. Appearance: He is not ill-appearing, toxic-appearing or diaphoretic. Neck:      Musculoskeletal: No neck rigidity or muscular tenderness.    Pulmonary:

## 2020-09-02 ENCOUNTER — APPOINTMENT (OUTPATIENT)
Dept: GENERAL RADIOLOGY | Age: 30
End: 2020-09-02

## 2020-09-02 ENCOUNTER — HOSPITAL ENCOUNTER (EMERGENCY)
Age: 30
Discharge: HOME | End: 2020-09-02

## 2020-09-02 VITALS
WEIGHT: 260 LBS | DIASTOLIC BLOOD PRESSURE: 68 MMHG | BODY MASS INDEX: 33.37 KG/M2 | HEART RATE: 94 BPM | TEMPERATURE: 96.9 F | SYSTOLIC BLOOD PRESSURE: 130 MMHG | OXYGEN SATURATION: 97 % | HEIGHT: 74 IN | RESPIRATION RATE: 16 BRPM

## 2020-09-02 PROCEDURE — 99213 OFFICE O/P EST LOW 20 MIN: CPT | Performed by: NURSE PRACTITIONER

## 2020-09-02 PROCEDURE — 73564 X-RAY EXAM KNEE 4 OR MORE: CPT

## 2020-09-02 PROCEDURE — 99213 OFFICE O/P EST LOW 20 MIN: CPT

## 2020-09-02 RX ORDER — MENTHOL/CAMPHOR
OINTMENT (GRAM) TOPICAL
Refills: 0 | COMMUNITY
Start: 2020-09-02 | End: 2020-09-10

## 2020-09-02 RX ORDER — IBUPROFEN 400 MG/1
400 TABLET ORAL EVERY 6 HOURS PRN
Qty: 120 TABLET | Refills: 0 | COMMUNITY
Start: 2020-09-02 | End: 2020-09-10

## 2020-09-02 ASSESSMENT — ENCOUNTER SYMPTOMS
WHEEZING: 0
SHORTNESS OF BREATH: 0
BACK PAIN: 0
CHOKING: 0
COLOR CHANGE: 0
COUGH: 0
CHEST TIGHTNESS: 0
STRIDOR: 0

## 2020-09-02 ASSESSMENT — PAIN SCALES - GENERAL: PAINLEVEL_OUTOF10: 4

## 2020-09-02 ASSESSMENT — PAIN DESCRIPTION - ORIENTATION: ORIENTATION: RIGHT

## 2020-09-02 ASSESSMENT — PAIN DESCRIPTION - LOCATION: LOCATION: KNEE

## 2020-09-02 NOTE — ED TRIAGE NOTES
Has had pain in right knee for several years, became worse yesterday, was moving sideways while at work

## 2020-09-02 NOTE — ED PROVIDER NOTES
[place] [id number]  Urgent Care Encounter      CHIEF COMPLAINT       Chief Complaint   Patient presents with    Knee Pain       Nurses Notes reviewed and I agree except as noted in the HPI. HISTORY OFPRESENT ILLNESS   Mima Sepulveda is a 30 y.o. The history is provided by the patient. No  was used. Knee Problem   Location:  Knee  Time since incident:  2 days  Injury: no    Knee location:  R knee  Pain details:     Quality:  Sharp and aching    Radiates to:  Does not radiate    Severity:  Moderate (variable)    Onset quality:  Sudden    Duration:  1 day    Timing:  Intermittent    Progression:  Waxing and waning  Chronicity:  Recurrent  Dislocation: no    Foreign body present:  No foreign bodies  Tetanus status:  Unknown  Prior injury to area:  Yes  Worsened by:  Extension, flexion, rotation and activity  Ineffective treatments:  Compression  Associated symptoms: decreased ROM and stiffness    Associated symptoms: no back pain, no fatigue, no fever, no itching, no muscle weakness, no neck pain, no numbness, no swelling and no tingling    Risk factors: no concern for non-accidental trauma, no frequent fractures, no known bone disorder, no obesity and no recent illness        REVIEW OF SYSTEMS     Review of Systems   Constitutional: Negative for activity change, appetite change, chills, diaphoresis, fatigue and fever. Respiratory: Negative for cough, choking, chest tightness, shortness of breath, wheezing and stridor. Cardiovascular: Negative for chest pain, palpitations and leg swelling. Musculoskeletal: Positive for gait problem, myalgias and stiffness. Negative for arthralgias, back pain, joint swelling, neck pain and neck stiffness. Skin: Negative for color change, itching, pallor, rash and wound.        PAST MEDICAL HISTORY         Diagnosis Date    Psychiatric problem        SURGICAL HISTORY     Patient  has a past surgical history that includes Wrist surgery. CURRENT MEDICATIONS       Previous Medications    RANITIDINE HCL (ZANTAC PO)    Take by mouth       ALLERGIES     Patient is is allergic to pcn [penicillins]. FAMILY HISTORY     Patient's family history includes Cancer in his paternal grandfather; Diabetes in his father and paternal grandfather; Heart Disease in his mother. SOCIAL HISTORY     Patient  reports that he has been smoking cigarettes. He has a 5.00 pack-year smoking history. He has never used smokeless tobacco. He reports current alcohol use of about 4.0 standard drinks of alcohol per week. He reports previous drug use. PHYSICAL EXAM     ED TRIAGE VITALS  BP: 130/68, Temp: 96.9 °F (36.1 °C), Pulse: 94, Resp: 16, SpO2: 97 %  Physical Exam  Vitals signs and nursing note reviewed. Constitutional:       Appearance: Normal appearance. He is normal weight. HENT:      Right Ear: External ear normal.   Eyes:      Extraocular Movements: Extraocular movements intact. Conjunctiva/sclera: Conjunctivae normal.   Cardiovascular:      Pulses: Normal pulses. Pulmonary:      Effort: Pulmonary effort is normal.   Musculoskeletal: Normal range of motion. Right knee: Tenderness found. Medial joint line and MCL tenderness noted. Skin:     General: Skin is warm. Neurological:      General: No focal deficit present. Mental Status: He is alert and oriented to person, place, and time. Psychiatric:         Mood and Affect: Mood normal.         Behavior: Behavior normal.         Thought Content: Thought content normal.         Judgment: Judgment normal.         DIAGNOSTIC RESULTS   Labs:No results found for this visit on 09/02/20. IMAGING:  XR KNEE RIGHT (MIN 4 VIEWS)   Final Result   No acute fracture or dislocation. **This report has been created using voice recognition software. It may contain minor errors which are inherent in voice recognition technology. **      Final report electronically signed by Dr. Robyn Ross on 9/2/2020 6:28 PM        URGENT CARE COURSE:     Vitals:    09/02/20 1758   BP: 130/68   Pulse: 94   Resp: 16   Temp: 96.9 °F (36.1 °C)   TempSrc: Infrared   SpO2: 97%   Weight: 260 lb (117.9 kg)   Height: 6' 2\" (1.88 m)       Medications - No data to display  PROCEDURES:  None  FINAL IMPRESSION      1. Strain of right knee, initial encounter        DISPOSITION/PLAN     Compression with Wrap  Ice, elevation 15-20 minutes 3 times a day for the first 24-48 hours followed with heat 15-20 minutes 3 times a day thereafter. Activity as tolerated. Take medication as directed  Return for worsening symptoms, otherwise if symptoms persist follow up orthopedics in 1 to 3 days. PATIENT REFERRED TO:  [facility] [address] [facility]. [facility]  Go to   If symptoms worsen    DISCHARGE MEDICATIONS:  New Prescriptions    IBUPROFEN (IBU) 400 MG TABLET    Take 1 tablet by mouth every 6 hours as needed for Pain    MENTHOL-CAMPHOR (TIGER BALM EXTRA STRENGTH) 11-10 % OINT    Follow package directions for topical use.      Current Discharge Medication List          ELISE Romero - CNP          ELISE Romero - [facility]  09/02/20 0726

## 2020-09-10 ENCOUNTER — HOSPITAL ENCOUNTER (EMERGENCY)
Age: 30
Discharge: HOME | End: 2020-09-10

## 2020-09-10 VITALS
TEMPERATURE: 98.3 F | OXYGEN SATURATION: 98 % | RESPIRATION RATE: 16 BRPM | SYSTOLIC BLOOD PRESSURE: 142 MMHG | DIASTOLIC BLOOD PRESSURE: 82 MMHG | HEART RATE: 76 BPM

## 2020-09-10 PROCEDURE — 99213 OFFICE O/P EST LOW 20 MIN: CPT | Performed by: NURSE PRACTITIONER

## 2020-09-10 PROCEDURE — 99212 OFFICE O/P EST SF 10 MIN: CPT

## 2020-09-10 RX ORDER — PROPARACAINE HYDROCHLORIDE 5 MG/ML
SOLUTION/ DROPS OPHTHALMIC
Status: DISCONTINUED
Start: 2020-09-10 | End: 2020-09-10 | Stop reason: HOSPADM

## 2020-09-10 RX ORDER — PROPARACAINE HYDROCHLORIDE 5 MG/ML
1 SOLUTION/ DROPS OPHTHALMIC ONCE
Status: DISCONTINUED | OUTPATIENT
Start: 2020-09-10 | End: 2020-09-10 | Stop reason: HOSPADM

## 2020-09-10 RX ORDER — MOXIFLOXACIN 5 MG/ML
1 SOLUTION/ DROPS OPHTHALMIC 3 TIMES DAILY
Qty: 1 BOTTLE | Refills: 0 | Status: SHIPPED | OUTPATIENT
Start: 2020-09-10 | End: 2020-09-17

## 2020-09-10 ASSESSMENT — ENCOUNTER SYMPTOMS
EYE DISCHARGE: 1
SHORTNESS OF BREATH: 0
NAUSEA: 0
COUGH: 0
VOMITING: 0
PHOTOPHOBIA: 0
TROUBLE SWALLOWING: 0
SORE THROAT: 0
EYE PAIN: 1
RHINORRHEA: 0
EYE REDNESS: 1
DIARRHEA: 0

## 2020-09-10 ASSESSMENT — PAIN SCALES - GENERAL: PAINLEVEL_OUTOF10: 1

## 2020-09-10 ASSESSMENT — PAIN DESCRIPTION - LOCATION: LOCATION: EYE

## 2020-09-10 ASSESSMENT — PAIN DESCRIPTION - ORIENTATION: ORIENTATION: LEFT

## 2020-09-10 NOTE — LETTER
[facility]  [address] [place] [id number]  Phone: [phone]             September 10, 2020    Patient: Viki Logan   YOB: 1990   Date of Visit: 9/10/2020       To Whom It May Concern:    Manuel Ibarra was seen and treated in our emergency department on 9/10/2020. He may return on 9/11/20.       Sincerely,             Signature:__________________________________

## 2020-09-10 NOTE — ED PROVIDER NOTES
[address] Encounter      [address] [facility]       Chief Complaint   Patient presents with    Conjunctivitis       Nurses Notes reviewed and I agree except as noted in the HPI. HISTORY OF PRESENT ILLNESS   Prasanna Santos is a 30 y.o. male who presents with complaints of left eye redness and irritation. Onset yesterday, unchanged. Pain is burning/aching, continuous. Rates 1/10. Possible foreign body to the left medial eye. No known injury. No treatment prior to arrival.    REVIEW OF SYSTEMS     Review of Systems   Constitutional: Negative for chills, diaphoresis, fatigue and fever. HENT: Negative for congestion, ear pain, rhinorrhea, sore throat and trouble swallowing. Eyes: Positive for pain, discharge (watery) and redness. Negative for photophobia and visual disturbance. Respiratory: Negative for cough and shortness of breath. Cardiovascular: Negative for chest pain. Gastrointestinal: Negative for diarrhea, nausea and vomiting. Genitourinary: Negative for decreased urine volume. Musculoskeletal: Negative for neck pain and neck stiffness. Skin: Negative for rash. Neurological: Negative for headaches. Hematological: Negative for adenopathy. Psychiatric/Behavioral: Negative for sleep disturbance. PAST MEDICAL HISTORY         Diagnosis Date    Psychiatric problem        SURGICAL HISTORY     Patient  has a past surgical history that includes Wrist surgery. CURRENT MEDICATIONS       Discharge Medication List as of 9/10/2020  3:57 PM      CONTINUE these medications which have NOT CHANGED    Details   raNITIdine HCl (ZANTAC PO) Take by mouthHistorical Med             ALLERGIES     Patient is is allergic to pcn [penicillins]. FAMILY HISTORY     Patient'sfamily history includes Cancer in his paternal grandfather; Diabetes in his father and paternal grandfather; Heart Disease in his mother.     SOCIAL HISTORY     Patient  reports that he has administration in time range)     PROCEDURES:  None  FINALIMPRESSION      1. Abrasion of left cornea, initial encounter        DISPOSITION/PLAN   DISPOSITION Decision To Discharge 09/10/2020 03:56:17 PM    No foreign body noted to the left eye. Small fluorescein uptake at 12:00 and 3:00 to the left eye. No ulcer or infiltrate. Exam consistent with abrasion. Medication as prescribed. Follow-up as needed. If symptoms worsen go to ER. PATIENT REFERRED TO:  [address] [place] [address] Urgent Care  [facility] [address] [facility]  [phone]    Follow up as needed. Medication as prescribed. If worse go to ER.     DISCHARGE MEDICATIONS:  Discharge Medication List as of 9/10/2020  3:57 PM      START taking these medications    Details   moxifloxacin (VIGAMOX) 0.5 % ophthalmic solution Place 1 drop into the left eye 3 times daily for 7 days, Disp-1 Bottle,R-0Normal           Discharge Medication List as of 9/10/2020  3:57 PM          [address], APRN - CNP  09/10/20 [id number]

## 2020-09-27 ENCOUNTER — HOSPITAL ENCOUNTER (EMERGENCY)
Age: 30
Discharge: HOME | End: 2020-09-27

## 2020-09-27 VITALS
TEMPERATURE: 97.1 F | OXYGEN SATURATION: 97 % | HEIGHT: 74 IN | BODY MASS INDEX: 33.37 KG/M2 | HEART RATE: 106 BPM | RESPIRATION RATE: 16 BRPM | DIASTOLIC BLOOD PRESSURE: 83 MMHG | WEIGHT: 260 LBS | SYSTOLIC BLOOD PRESSURE: 129 MMHG

## 2020-09-27 PROCEDURE — 99213 OFFICE O/P EST LOW 20 MIN: CPT | Performed by: NURSE PRACTITIONER

## 2020-09-27 PROCEDURE — 99212 OFFICE O/P EST SF 10 MIN: CPT

## 2020-09-27 RX ORDER — NAPROXEN SODIUM 220 MG
550 TABLET ORAL 2 TIMES DAILY WITH MEALS
COMMUNITY

## 2020-09-27 RX ORDER — OFLOXACIN 3 MG/ML
10 SOLUTION AURICULAR (OTIC) 2 TIMES DAILY
Qty: 10 ML | Refills: 0 | Status: SHIPPED | OUTPATIENT
Start: 2020-09-27 | End: 2020-10-07

## 2020-09-27 ASSESSMENT — PAIN DESCRIPTION - LOCATION: LOCATION: EAR

## 2020-09-27 ASSESSMENT — ENCOUNTER SYMPTOMS
DIARRHEA: 0
WHEEZING: 0
SHORTNESS OF BREATH: 0
ABDOMINAL PAIN: 0
CONSTIPATION: 0
NAUSEA: 0
SINUS PAIN: 0
VOMITING: 0
COUGH: 0
RHINORRHEA: 0
SORE THROAT: 0
SINUS PRESSURE: 0

## 2020-09-27 ASSESSMENT — PAIN DESCRIPTION - ORIENTATION: ORIENTATION: LEFT

## 2020-09-27 ASSESSMENT — PAIN - FUNCTIONAL ASSESSMENT: PAIN_FUNCTIONAL_ASSESSMENT: PREVENTS OR INTERFERES WITH MANY ACTIVE NOT PASSIVE ACTIVITIES

## 2020-09-27 ASSESSMENT — PAIN DESCRIPTION - FREQUENCY: FREQUENCY: CONTINUOUS

## 2020-09-27 ASSESSMENT — PAIN SCALES - GENERAL: PAINLEVEL_OUTOF10: 8

## 2020-09-27 ASSESSMENT — PAIN DESCRIPTION - PAIN TYPE: TYPE: ACUTE PAIN

## 2020-09-27 ASSESSMENT — PAIN DESCRIPTION - DESCRIPTORS: DESCRIPTORS: SHARP;PRESSURE

## 2020-09-27 NOTE — ED PROVIDER NOTES
paternal grandfather; Diabetes in his father and paternal grandfather; Heart Disease in his mother. SOCIAL HISTORY     Patient  reports that he has been smoking cigarettes. He has a 5.00 pack-year smoking history. He has never used smokeless tobacco. He reports current alcohol use of about 4.0 standard drinks of alcohol per week. He reports previous drug use. PHYSICAL EXAM     ED TRIAGE VITALS  BP: 129/83, Temp: 97.1 °F (36.2 °C), Pulse: 106, Resp: 16, SpO2: 97 %  Physical Exam  Vitals signs and nursing note reviewed. Constitutional:       Appearance: Normal appearance. He is well-developed, well-groomed and normal weight. HENT:      Head: Normocephalic and atraumatic. Right Ear: Hearing, tympanic membrane, ear canal and external ear normal.      Left Ear: Tenderness (pain with movement of ear) present. A middle ear effusion is present. No mastoid tenderness. Tympanic membrane is erythematous. Nose: Nose normal.      Mouth/Throat:      Lips: Pink. Mouth: Mucous membranes are moist.      Pharynx: Oropharynx is clear. Uvula midline. Neck:      Musculoskeletal: Normal range of motion and neck supple. Cardiovascular:      Rate and Rhythm: Normal rate and regular rhythm. Heart sounds: Normal heart sounds. Pulmonary:      Effort: Pulmonary effort is normal.      Breath sounds: Normal breath sounds and air entry. Lymphadenopathy:      Cervical: No cervical adenopathy. Neurological:      Mental Status: He is alert. GCS: GCS eye subscore is 4. GCS verbal subscore is 5. GCS motor subscore is 6. Psychiatric:         Behavior: Behavior is cooperative. DIAGNOSTIC RESULTS   Labs: No results found for this visit on 09/27/20.     IMAGING:  No orders to display     URGENT CARE COURSE:     Vitals:    09/27/20 1144   BP: 129/83   Pulse: 106   Resp: 16   Temp: 97.1 °F (36.2 °C)   TempSrc: Temporal   SpO2: 97%   Weight: 260 lb (117.9 kg)   Height: 6' 2\" (1.88 m)       Medications - No data to display  PROCEDURES:  None  FINALIMPRESSION      1. Infective otitis externa of left ear        DISPOSITION/PLAN   DISPOSITION Decision To Discharge 09/27/2020 12:01:00 PM     Avoid water in the ear(s). No foreign objects in the ear(s). No swimming for the next week. May use over the counter Tylenol or Motrin as directed per label instructions as needed for pain or fever. Take all prescription medications as prescribed      PATIENT REFERRED TO:  [address] [place] [address],[address]  [address] [place] [address] Rd  Call in 2 days  As needed, If symptoms worsen go to emergency room    DISCHARGE MEDICATIONS:  New Prescriptions    OFLOXACIN (FLOXIN) 0.3 % OTIC SOLUTION    Place 10 drops into the left ear 2 times daily for 10 days     Current Discharge Medication List          Rebecca Marks, [facility], APRN - CNP  09/27/20 7260